# Patient Record
Sex: MALE | Race: BLACK OR AFRICAN AMERICAN | NOT HISPANIC OR LATINO | Employment: FULL TIME | URBAN - METROPOLITAN AREA
[De-identification: names, ages, dates, MRNs, and addresses within clinical notes are randomized per-mention and may not be internally consistent; named-entity substitution may affect disease eponyms.]

---

## 2024-11-11 ENCOUNTER — HOSPITAL ENCOUNTER (EMERGENCY)
Facility: HOSPITAL | Age: 24
Discharge: HOME | End: 2024-11-11
Attending: STUDENT IN AN ORGANIZED HEALTH CARE EDUCATION/TRAINING PROGRAM
Payer: MEDICAID

## 2024-11-11 VITALS
BODY MASS INDEX: 28.44 KG/M2 | DIASTOLIC BLOOD PRESSURE: 65 MMHG | HEIGHT: 72 IN | TEMPERATURE: 98 F | OXYGEN SATURATION: 100 % | RESPIRATION RATE: 18 BRPM | WEIGHT: 210 LBS | SYSTOLIC BLOOD PRESSURE: 124 MMHG | HEART RATE: 73 BPM
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
## 2024-11-11 DIAGNOSIS — S82.861A CLOSED DISPLACED MAISONNEUVE FRACTURE OF RIGHT LOWER EXTREMITY, INITIAL ENCOUNTER: Primary | ICD-10-CM

## 2024-11-11 DIAGNOSIS — Q72.91: ICD-10-CM

## 2024-11-11 LAB
ABS NEUT (OLG): 2.88 X10(3)/MCL (ref 2.1–9.2)
ALBUMIN SERPL-MCNC: 4.1 G/DL (ref 3.5–5)
ALBUMIN/GLOB SERPL: 1.2 RATIO (ref 1.1–2)
ALP SERPL-CCNC: 83 UNIT/L (ref 40–150)
ALT SERPL-CCNC: 35 UNIT/L (ref 0–55)
ANION GAP SERPL CALC-SCNC: 8 MEQ/L
AST SERPL-CCNC: 35 UNIT/L (ref 5–34)
BILIRUB SERPL-MCNC: 0.2 MG/DL
BUN SERPL-MCNC: 8.9 MG/DL (ref 8.9–20.6)
CALCIUM SERPL-MCNC: 9.4 MG/DL (ref 8.4–10.2)
CHLORIDE SERPL-SCNC: 109 MMOL/L (ref 98–107)
CO2 SERPL-SCNC: 23 MMOL/L (ref 22–29)
CREAT SERPL-MCNC: 1.09 MG/DL (ref 0.72–1.25)
CREAT/UREA NIT SERPL: 8
EOSINOPHIL NFR BLD MANUAL: 0.13 X10(3)/MCL (ref 0–0.9)
EOSINOPHIL NFR BLD MANUAL: 2 %
ERYTHROCYTE [DISTWIDTH] IN BLOOD BY AUTOMATED COUNT: 13.2 % (ref 11.5–17)
GFR SERPLBLD CREATININE-BSD FMLA CKD-EPI: >60 ML/MIN/1.73/M2
GLOBULIN SER-MCNC: 3.4 GM/DL (ref 2.4–3.5)
GLUCOSE SERPL-MCNC: 109 MG/DL (ref 74–100)
HCT VFR BLD AUTO: 40.6 % (ref 42–52)
HGB BLD-MCNC: 13.4 G/DL (ref 14–18)
INR PPP: 1
INSTRUMENT WBC (OLG): 6.26 X10(3)/MCL
LYMPHOCYTES NFR BLD MANUAL: 2.94 X10(3)/MCL
LYMPHOCYTES NFR BLD MANUAL: 47 %
MCH RBC QN AUTO: 28.5 PG (ref 27–31)
MCHC RBC AUTO-ENTMCNC: 33 G/DL (ref 33–36)
MCV RBC AUTO: 86.4 FL (ref 80–94)
MONOCYTES NFR BLD MANUAL: 0.31 X10(3)/MCL (ref 0.1–1.3)
MONOCYTES NFR BLD MANUAL: 5 %
NEUTROPHILS NFR BLD MANUAL: 46 %
NRBC BLD AUTO-RTO: 0 %
PLATELET # BLD AUTO: 241 X10(3)/MCL (ref 130–400)
PLATELET # BLD EST: NORMAL 10*3/UL
PMV BLD AUTO: 10.9 FL (ref 7.4–10.4)
POTASSIUM SERPL-SCNC: 3.8 MMOL/L (ref 3.5–5.1)
PROT SERPL-MCNC: 7.5 GM/DL (ref 6.4–8.3)
PROTHROMBIN TIME: 13.7 SECONDS (ref 12.5–14.5)
RBC # BLD AUTO: 4.7 X10(6)/MCL (ref 4.7–6.1)
RBC MORPH BLD: NORMAL
SODIUM SERPL-SCNC: 140 MMOL/L (ref 136–145)
WBC # BLD AUTO: 6.26 X10(3)/MCL (ref 4.5–11.5)

## 2024-11-11 PROCEDURE — 63600175 PHARM REV CODE 636 W HCPCS: Performed by: STUDENT IN AN ORGANIZED HEALTH CARE EDUCATION/TRAINING PROGRAM

## 2024-11-11 PROCEDURE — 85610 PROTHROMBIN TIME: CPT | Performed by: STUDENT IN AN ORGANIZED HEALTH CARE EDUCATION/TRAINING PROGRAM

## 2024-11-11 PROCEDURE — 96375 TX/PRO/DX INJ NEW DRUG ADDON: CPT

## 2024-11-11 PROCEDURE — 99285 EMERGENCY DEPT VISIT HI MDM: CPT | Mod: 25

## 2024-11-11 PROCEDURE — 96374 THER/PROPH/DIAG INJ IV PUSH: CPT

## 2024-11-11 PROCEDURE — 63600175 PHARM REV CODE 636 W HCPCS: Performed by: NURSE PRACTITIONER

## 2024-11-11 PROCEDURE — 25000003 PHARM REV CODE 250: Performed by: STUDENT IN AN ORGANIZED HEALTH CARE EDUCATION/TRAINING PROGRAM

## 2024-11-11 PROCEDURE — 80053 COMPREHEN METABOLIC PANEL: CPT | Performed by: STUDENT IN AN ORGANIZED HEALTH CARE EDUCATION/TRAINING PROGRAM

## 2024-11-11 PROCEDURE — 27781 TREATMENT OF FIBULA FRACTURE: CPT | Mod: RT

## 2024-11-11 PROCEDURE — 85007 BL SMEAR W/DIFF WBC COUNT: CPT | Performed by: STUDENT IN AN ORGANIZED HEALTH CARE EDUCATION/TRAINING PROGRAM

## 2024-11-11 RX ORDER — ONDANSETRON HYDROCHLORIDE 2 MG/ML
4 INJECTION, SOLUTION INTRAVENOUS
Status: COMPLETED | OUTPATIENT
Start: 2024-11-11 | End: 2024-11-11

## 2024-11-11 RX ORDER — HYDROCODONE BITARTRATE AND ACETAMINOPHEN 5; 325 MG/1; MG/1
1 TABLET ORAL EVERY 6 HOURS PRN
Qty: 12 TABLET | Refills: 0 | Status: SHIPPED | OUTPATIENT
Start: 2024-11-11

## 2024-11-11 RX ORDER — HYDROCODONE BITARTRATE AND ACETAMINOPHEN 10; 325 MG/1; MG/1
1 TABLET ORAL
Status: COMPLETED | OUTPATIENT
Start: 2024-11-11 | End: 2024-11-11

## 2024-11-11 RX ORDER — METHOCARBAMOL 100 MG/ML
1000 INJECTION, SOLUTION INTRAMUSCULAR; INTRAVENOUS ONCE
Status: COMPLETED | OUTPATIENT
Start: 2024-11-11 | End: 2024-11-11

## 2024-11-11 RX ORDER — METHOCARBAMOL 500 MG/1
1000 TABLET, FILM COATED ORAL 3 TIMES DAILY
Qty: 30 TABLET | Refills: 0 | Status: SHIPPED | OUTPATIENT
Start: 2024-11-11 | End: 2024-11-16

## 2024-11-11 RX ORDER — FENTANYL CITRATE 50 UG/ML
100 INJECTION, SOLUTION INTRAMUSCULAR; INTRAVENOUS
Status: COMPLETED | OUTPATIENT
Start: 2024-11-11 | End: 2024-11-11

## 2024-11-11 RX ORDER — ONDANSETRON 4 MG/1
4 TABLET, ORALLY DISINTEGRATING ORAL EVERY 6 HOURS PRN
Qty: 12 TABLET | Refills: 0 | Status: SHIPPED | OUTPATIENT
Start: 2024-11-11

## 2024-11-11 RX ORDER — MORPHINE SULFATE 4 MG/ML
4 INJECTION, SOLUTION INTRAMUSCULAR; INTRAVENOUS
Status: COMPLETED | OUTPATIENT
Start: 2024-11-11 | End: 2024-11-11

## 2024-11-11 RX ADMIN — HYDROCODONE BITARTRATE AND ACETAMINOPHEN 1 TABLET: 10; 325 TABLET ORAL at 07:11

## 2024-11-11 RX ADMIN — FENTANYL CITRATE 100 MCG: 50 INJECTION, SOLUTION INTRAMUSCULAR; INTRAVENOUS at 06:11

## 2024-11-11 RX ADMIN — ONDANSETRON 4 MG: 2 INJECTION INTRAMUSCULAR; INTRAVENOUS at 02:11

## 2024-11-11 RX ADMIN — METHOCARBAMOL 1000 MG: 100 INJECTION INTRAMUSCULAR; INTRAVENOUS at 03:11

## 2024-11-11 RX ADMIN — MORPHINE SULFATE 4 MG: 4 INJECTION INTRAVENOUS at 02:11

## 2024-11-11 NOTE — FIRST PROVIDER EVALUATION
Medical screening examination initiated.  I have conducted a focused provider triage encounter, findings are as follows:    Brief history of present illness:  Patient states right ankle and right lower leg pain after falling while riding a skateboard PTA.    Vitals:    11/11/24 1425   BP: (!) 167/85   Pulse: 98   Resp: 18   Temp: 98.3 °F (36.8 °C)   TempSrc: Oral   SpO2: 100%   Weight: 95.3 kg (210 lb)   Height: 6' (1.829 m)       Pertinent physical exam:  Awake, alert    Brief workup plan:  Imaging    Preliminary workup initiated; this workup will be continued and followed by the physician or advanced practice provider that is assigned to the patient when roomed.

## 2024-11-11 NOTE — Clinical Note
"Haley Bernaley" Ramiro was seen and treated in our emergency department on 11/11/2024.  He may return to school on 11/18/2024.      If you have any questions or concerns, please don't hesitate to call.      Maddi Tay RN"

## 2024-11-12 NOTE — ED PROVIDER NOTES
Encounter Date: 11/11/2024       History     Chief Complaint   Patient presents with    Leg Injury     Pt to ED via [facility]. Pt was on a skateboard and fell off. Ems reports a deformity to the RLE, pulses are good, Carlitos splint in place.     Haley Rubio is a 24 y.o. male with a past medical history of none who presents to the ED for right leg pain after a fall from a motorized scooter.  He denies any head injury or loss of consciousness.  He denies any neck, chest, abdominal pain, back pain.  He reports his pain has localized to his right lower leg.         Review of patient's allergies indicates:  No Known Allergies  No past medical history on file.  No past surgical history on file.  No family history on file.     Review of Systems   Constitutional:  Negative for fever.   HENT:  Negative for sore throat.    Respiratory:  Negative for shortness of breath.    Cardiovascular:  Negative for chest pain.   Gastrointestinal:  Negative for nausea.   Genitourinary:  Negative for dysuria.   Musculoskeletal:  Negative for back pain.   Skin:  Negative for rash.   Neurological:  Negative for weakness.   Hematological:  Does not bruise/bleed easily.       Physical Exam     Initial Vitals [11/11/24 1425]   BP Pulse Resp Temp SpO2   (!) 167/85 98 18 98.3 °F (36.8 °C) 100 %      MAP       --         Physical Exam    Nursing note and vitals reviewed.  Constitutional: He appears well-developed.   Eyes: EOM are normal. Pupils are equal, round, and reactive to light.   Cardiovascular:  Normal rate and regular rhythm.           No murmur heard.  Pulmonary/Chest: Breath sounds normal. No respiratory distress. He has no wheezes. He has no rales.   Abdominal: Abdomen is soft. He exhibits no distension. There is no abdominal tenderness.   Musculoskeletal:         General: Tenderness and edema present.     Neurological: He is alert. GCS score is 15. GCS eye subscore is 4. GCS verbal subscore is 5. GCS motor subscore is 6.   Skin: Skin is warm.  "Capillary refill takes less than 2 seconds. No rash noted.         ED Course   Splint Application    Date/Time: 2024 7:06 PM    Performed by: Colton Huffman MD  Authorized by: Colton Huffman MD  Consent Done: Yes  Consent: Verbal consent obtained.  Risks and benefits: risks, benefits and alternatives were discussed  Consent given by: patient  Patient identity confirmed: , name and verbally with patient  Time out: Immediately prior to procedure a "time out" was called to verify the correct patient, procedure, equipment, support staff and site/side marked as required.  Location details: right ankle  Splint type: long leg  Supplies used: Ortho-Glass  Post-procedure: The splinted body part was neurovascularly unchanged following the procedure.  Patient tolerance: Patient tolerated the procedure well with no immediate complications      Orthopedic Injury    Date/Time: 2024 6:00 PM    Performed by: Colton Huffman MD  Authorized by: Colton Huffman MD    Location procedure was performed:  [facility] EMERGENCY DEPARTMENT  Pre-operative diagnosis:  Fibula fracture  Post-operative diagnosis:  Fibula fracture  Consent Done?:  Yes  Universal Protocol:     Verbal consent obtained?: Yes      Risks and benefits: Risks, benefits and alternatives were discussed      Consent given by:  Patient    Patient identity confirmed:  , name and verbally with patient    Time Out: Immediately prior to the procedure a time out was called    Injury:     Injury location:  Lower leg    Location details:  Right lower leg    Injury type:  Fracture    Fracture type: proximal fibula        Pre-procedure assessment:     Neurovascular status: Neurovascularly intact      Distal perfusion: normal      Neurological function: normal      Range of motion: reduced      Local anesthesia used?: No      Patient sedated?: No        Selections made in this section will also lock the Injury type section above.:     Manipulation " performed?: Yes      Immobilization:  Splint    Splint type:  Long leg    Supplies used:  Ortho-Glass    Complications: No      Estimated blood loss (mL):  0    Specimens: No      Implants: No    Post-procedure assessment:     Neurovascular status: Neurovascularly intact      Distal perfusion: normal      Neurological function: normal      Range of motion: splinted      Patient tolerance:  Patient tolerated the procedure well with no immediate complications    Labs Reviewed   COMPREHENSIVE METABOLIC PANEL - Abnormal       Result Value    Sodium 140      Potassium 3.8      Chloride 109 (*)     CO2 23      Glucose 109 (*)     Blood Urea Nitrogen 8.9      Creatinine 1.09      Calcium 9.4      Protein Total 7.5      Albumin 4.1      Globulin 3.4      Albumin/Globulin Ratio 1.2      Bilirubin Total 0.2      ALP 83      ALT 35      AST 35 (*)     eGFR >60      Anion Gap 8.0      BUN/Creatinine Ratio 8     CBC WITH DIFFERENTIAL - Abnormal    WBC 6.26      RBC 4.70      Hgb 13.4 (*)     Hct 40.6 (*)     MCV 86.4      MCH 28.5      MCHC 33.0      RDW 13.2      Platelet 241      MPV 10.9 (*)     NRBC% 0.0     PROTIME-INR - Normal    PT 13.7      INR 1.0     CBC W/ AUTO DIFFERENTIAL    Narrative:     The following orders were created for panel order CBC auto differential.  Procedure                               Abnormality         Status                     ---------                               -----------         ------                     CBC with Differential[[id number]]       Abnormal            Final result               Manual Differential[[id number]]                             Final result                 Please view results for these tests on the individual orders.   MANUAL DIFFERENTIAL    WBC 6.26      Neutrophils % 46      Lymphs % 47      Monocytes % 5      Eosinophils % 2      Neutrophils Abs 2.8796      Lymphs Abs 2.9422      Monocytes Abs 0.313      Eosinophils Abs 0.1252      Platelets Normal      RBC Morph  Normal            Imaging Results              CT Leg (Tibia-Fibula) Without Contrast Left (Final result)  Result time 11/11/24 19:28:05      Final result by Jesus Bautista MD (11/11/24 19:28:05)                   Impression:      Fracture proximal shaft of the fibula.      Electronically signed by: Jesus Bautista  Date:    11/11/2024  Time:    19:28               Narrative:    EXAMINATION:  CT LEG (TIBIA-FIBULA) WITHOUT CONTRAST LEFT    CLINICAL HISTORY:  Fracture, tib/fib;    TECHNIQUE:  Helical acquisition through the left tibia and fibula without IV contrast.  Three plane reconstructions were provided for review.  mGycm. Automatic exposure control, adjustment of mA/kV or iterative reconstruction technique was used to reduce radiation.    COMPARISON:  Radiographs earlier today    FINDINGS:  There is mildly comminuted fracture proximal shaft of the fibula with near anatomic position and alignment.  No convincing acute fracture of the tibia.  Few punctate ossifications adjacent to the distal tibia favored chronic.  No joint effusion within the knee.                                       X-Ray Tibia Fibula 2 View Right (Final result)  Result time 11/11/24 18:59:35      Final result by José Manuel Larios MD (11/11/24 18:59:35)                   Impression:      Status post reduction of the slightly displaced proximal fibular fracture      Electronically signed by: Kevin Larios  Date:    11/11/2024  Time:    18:59               Narrative:    EXAMINATION:  XR TIBIA FIBULA 2 VIEW RIGHT    CLINICAL HISTORY:  Unspecified reduction defect of right lower limb    TECHNIQUE:  AP and lateral views of the right tibia and fibula were performed.    COMPARISON:  11/11/2024    FINDINGS:  There is a comminuted fracture of the proximal fibula.  It has been reduced in good anatomic alignment is noted.  No other fractures are seen.                                       X-Ray Ankle Complete Right (Final result)  Result time  11/11/24 15:16:28      Final result by Alberto Crowder MD (11/11/24 15:16:28)                   Narrative:    EXAMINATION  XR ANKLE COMPLETE 3 VIEW RIGHT    CLINICAL HISTORY  Unspecified fall, initial encounter    TECHNIQUE  A total of 3 images submitted of the right ankle.    COMPARISON  None available at the time of initial interpretation.    FINDINGS  There is abnormal widening of the medial clear space, measuring approximately 8 mm.  Slight asymmetric anterior widening of the tibiotalar joint space also noted.  No definite acutely displaced cortical fragment or gross dislocation is identified involving the hindfoot and partially visualized midfoot.    Regional soft tissue swelling is present, medial predominant.    IMPRESSION  1. Periarticular soft tissue swelling with widening of the medial clear space suggestive of ligamentous injury.  2. Possibility of subtle nondisplaced fracture is not entirely excluded.  Follow-up with non-contrast CT could be obtained for more sensitive assessment, if needed.      Electronically signed by: Alberto Crowder  Date:    11/11/2024  Time:    15:16                                     X-Ray Tibia Fibula 2 View Right (Final result)  Result time 11/11/24 15:23:13      Final result by Alberto Crowder MD (11/11/24 15:23:13)                   Narrative:    EXAMINATION  XR TIBIA FIBULA 2 VIEW RIGHT    CLINICAL HISTORY  Unspecified fall, initial encounter    TECHNIQUE  A total of 4 images submitted of the right leg.    COMPARISON  None available at the time of initial interpretation.    FINDINGS  Acute comminuted, minimally displaced fracture is present at the proximal fibular diaphysis.  No other definite acutely displaced fracture is appreciated.  There is no gross dislocation.    The included soft tissues are without acute abnormality.    IMPRESSION  Comminuted, minimally displaced proximal fibular fracture.      Electronically signed by: Alberto  Vel  Date:    11/11/2024  Time:    15:23                                     Medications   morphine injection 4 mg (4 mg Intravenous Given 11/11/24 1437)   ondansetron injection 4 mg (4 mg Intravenous Given 11/11/24 1437)   methocarbamoL injection 1,000 mg (1,000 mg Intravenous Given 11/11/24 1528)   fentaNYL injection 100 mcg (100 mcg Intravenous Given 11/11/24 1815)   HYDROcodone-acetaminophen  mg per tablet 1 tablet (1 tablet Oral Given 11/11/24 1929)     Medical Decision Making  Problems Addressed:  Closed displaced Maisonneuve fracture of right lower extremity, initial encounter: acute illness or injury  Fall: acute illness or injury  Reduction deformity of leg, right: acute illness or injury    Amount and/or Complexity of Data Reviewed  Labs: ordered.  Radiology: ordered. Decision-making details documented in ED Course.    Risk  Prescription drug management.      Differential diagnosis (includes but is not limited to):   Fracture, dislocation, abrasion, contusion, soft tissue injury, neurovascular injury    MDM Narrative  24-year-old male presents for evaluation of right ankle pain after a fall from a motorized skateboard earlier today.  He denies any head trauma or loss of consciousness.  He denies any neck, chest, abdominal pain.  It was reports significant right lower extremity pain.  Sensation is intact.  Palpable pulses to the right leg.  He does have some superficial abrasions.  Tetanus is up-to-date per patient.  Pain and nausea control given.  X-rays reviewed, evidence of a proximal fibula fracture noted as well as some widening of the clear space, concern for maisonneuve fracture.  Long-leg splint applied, CT scan ordered.  Case discussed with Orthopedic surgery, Dr. Valdez, patient can be discharged home with outpatient follow up in the office this week.  Patient in agreement with plan of care and states he will follow up with Orthopedic surgery has been discussed.  Strict return  precautions discussed and patient verbalized understanding.  Splint care instructions discussed.  Crutches provided for mobility.  Pain and nausea prescriptions provided.    Dispo: Discharge    My independent radiology interpretation: as above  Point of care US (independently performed and interpreted):   Decision rules/clinical scoring:     Sepsis Perfusion Assessment:     Amount and/or Complexity of Data Reviewed  Independent historian: none   Summary of history:   External data reviewed: notes from previous ED visits and notes from clinic visits  Summary of data reviewed: Prior records reviewed  Risk and benefits of testing: discussed   Labs: ordered and reviewed  Radiology: ordered and independent interpretation performed (see above or ED course)  ECG/medicine tests: ordered and independent interpretation performed (see above or ED course)  Discussion of management or test interpretation with external provider(s): discussed with ortho consultant   Summary of discussion: as above    Risk  Parenteral controlled substances   Drug therapy requiring intense monitoring for toxicity   Decision regarding hospitalization  Shared decision making     Critical Care  none    Data Reviewed/Counseling: I have personally reviewed the patient's vital signs, nursing notes, and other relevant tests, information, and imaging. I had a detailed discussion regarding the historical points, exam findings, and any diagnostic results supporting the discharge diagnosis. I personally performed the history, PE, MDM and procedures as documented above and agree with the scribe's documentation.    Portions of this note were dictated using voice recognition software. Although it was reviewed for accuracy, some inherent voice recognition errors may have occurred and may be present in this document.             ED Course as of 11/17/24 0550   Mon Nov 11, 2024   1536 X-Ray Tibia Fibula 2 View Right  Independently visualized/reviewed by me during  the ED visit.  - Proximal fibula fx [MC]   1537 X-Ray Ankle Complete Right  Independently visualized/reviewed by me during the ED visit.  - No obvious displaced fx but concern for joint space widening given proximal fibula fx and possible syndesmotic injury [MC]   1800 X-Ray Tibia Fibula 2 View Right  Independently visualized/reviewed by me during the ED visit.  - Splint in place, alignment improved [MC]   1923 I have reassessed the patient.  Patient is resting comfortably, no acute distress.  Vital signs stable.  Discussed all results including incidental findings.  Discussed need for follow up and discussed return precautions.  Answered all questions at this time.  Hemodynamically stable for continued outpatient management. Patient verbalized understanding and agreed to plan.    [MC]      ED Course User Index  [MC] Colton Huffman MD                           Clinical Impression:  Final diagnoses:  [W19.XXXA] Fall  [Q72.91] Reduction deformity of leg, right  [S82.861A] Closed displaced Maisonneuve fracture of right lower extremity, initial encounter (Primary)          ED Disposition Condition    Discharge Stable          ED Prescriptions       Medication Sig Dispense Start Date End Date Auth. Provider    HYDROcodone-acetaminophen (NORCO) 5-325 mg per tablet Take 1 tablet by mouth every 6 (six) hours as needed for Pain. 12 tablet 2024 -- Colton Huffman MD    methocarbamoL (ROBAXIN) 500 MG Tab () Take 2 tablets (1,000 mg total) by mouth 3 (three) times daily. for 5 days 30 tablet 2024 Colton Huffman MD    ondansetron (ZOFRAN-ODT) 4 MG TbDL Take 1 tablet (4 mg total) by mouth every 6 (six) hours as needed (Nausea). 12 tablet 2024 -- Colton Huffman MD          Follow-up Information       Follow up With Specialties Details Why Contact Info    Jovany Valdez MD Orthopedic Surgery Call in 1 day to confirm follow up appointment [address] [facility]  [address]  [facility] [address]  [phone]      Your PCP  Schedule an appointment as soon as possible for a visit       [facility] - Emergency Dept Emergency Medicine  If symptoms worsen [address] [facility] [id number] [phone]             Colton Huffman MD  11/17/24 0572

## 2024-11-12 NOTE — DISCHARGE INSTRUCTIONS

## 2024-11-13 ENCOUNTER — PATIENT MESSAGE (OUTPATIENT)
Dept: RESEARCH | Facility: HOSPITAL | Age: 24
End: 2024-11-13
Payer: MEDICAID

## 2024-11-20 ENCOUNTER — OFFICE VISIT (OUTPATIENT)
Dept: ORTHOPEDICS | Facility: CLINIC | Age: 24
End: 2024-11-20
Payer: MEDICAID

## 2024-11-20 VITALS
BODY MASS INDEX: 39.96 KG/M2 | HEIGHT: 72 IN | TEMPERATURE: 98 F | SYSTOLIC BLOOD PRESSURE: 122 MMHG | WEIGHT: 295 LBS | HEART RATE: 92 BPM | OXYGEN SATURATION: 98 % | DIASTOLIC BLOOD PRESSURE: 84 MMHG
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
## 2024-11-20 DIAGNOSIS — S82.861A CLOSED DISPLACED MAISONNEUVE FRACTURE OF RIGHT LOWER EXTREMITY, INITIAL ENCOUNTER: ICD-10-CM

## 2024-11-20 PROCEDURE — 99214 OFFICE O/P EST MOD 30 MIN: CPT | Mod: PBBFAC

## 2024-11-20 PROCEDURE — 99203 OFFICE O/P NEW LOW 30 MIN: CPT | Mod: 57,S$PBB,, | Performed by: ORTHOPAEDIC SURGERY

## 2024-11-20 PROCEDURE — 3008F BODY MASS INDEX DOCD: CPT | Mod: CPTII,,, | Performed by: ORTHOPAEDIC SURGERY

## 2024-11-20 PROCEDURE — 3074F SYST BP LT 130 MM HG: CPT | Mod: CPTII,,, | Performed by: ORTHOPAEDIC SURGERY

## 2024-11-20 PROCEDURE — 3079F DIAST BP 80-89 MM HG: CPT | Mod: CPTII,,, | Performed by: ORTHOPAEDIC SURGERY

## 2024-11-20 RX ORDER — METHOCARBAMOL 500 MG/1
1000 TABLET, FILM COATED ORAL 3 TIMES DAILY
COMMUNITY

## 2024-11-20 RX ORDER — CEFAZOLIN SODIUM 2 G/50ML
2 SOLUTION INTRAVENOUS
Status: CANCELLED | OUTPATIENT
Start: 2024-11-20

## 2024-11-20 RX ORDER — MUPIROCIN 20 MG/G
OINTMENT TOPICAL
Status: CANCELLED | OUTPATIENT
Start: 2024-11-20

## 2024-11-20 NOTE — PROGRESS NOTES
[facility]  Established Patient Office Visit  11/20/2024       Patient ID: Haley Rubio  YOB: 2000  MRN: [id number]    Chief Complaint: Injury of the Right Lower Leg (Closed displaced Maisonneuve fracture of right lower extremity due to skateboard)      HPI:  Haley Rubio is a 24 y.o. male fell off his electric skateboard 11/11 sustaining a right Maisonneuve fracture.  He had intermittent ambulate after the injury but had significant difficulty.  He reports continued pain to the right proximal fibula and ankle.  He denies any previous right ankle injury.  He was initially seen in the ED and placed in a long-leg splint.  He presents today for follow up    12 point ROS performed and negative except as above.     Past Medical History:    Past Medical History:   Diagnosis Date    Ankle fracture     right    History of spinal fracture      History reviewed. No pertinent surgical history.  Family History   Problem Relation Name Age of Onset    Hypertension Mother      Diabetes Father       Social History     Socioeconomic History    Marital status: Single   Tobacco Use    Smoking status: Never    Smokeless tobacco: Never   Substance and Sexual Activity    Alcohol use: Not Currently     Comment: occasional    Drug use: Never     Medication List with Changes/Refills   Current Medications    HYDROCODONE-ACETAMINOPHEN (NORCO) 5-325 MG PER TABLET    Take 1 tablet by mouth every 6 (six) hours as needed for Pain.    METHOCARBAMOL (ROBAXIN) 500 MG TAB    Take 1,000 mg by mouth 3 (three) times daily.    ONDANSETRON (ZOFRAN-ODT) 4 MG TBDL    Take 1 tablet (4 mg total) by mouth every 6 (six) hours as needed (Nausea).     Review of patient's allergies indicates:  No Known Allergies    Physical Exam:  Right Lower extremity:  Multiple superficial abrasions along the anterior aspect of the leg  1 area of road rash measuring 3 x 2 cm at the distal medial aspect of ankle  Diffuse swelling  about the ankle  TTP over proximal fibula.  Positive squeeze test  Motor intact: EHL/FHL/TA/GSC  SILT: DP/SP/T/Palacios/Sa  Palpable DP pulse    Imaging independently interpreted:  X-ray of the right ankle/tib-fib demonstrates comminuted proximal fibula fracture with mild shortening of the fibula.  Significant medial clear space widening on mortise view.     Assessment and Plan:    Haley Rubio is a 24 y.o. male who fell off an electric skateboard on 11/11 sustaining a right Maisonneuve fracture.    -given his syndesmotic injury and fibular shortening recommend surgical intervention for this injury.  Discussed risks and benefits.  Patient elected to proceed.  - plan for right syndesmosis fixation on 11/21/2024 with Dr. Parkinson.  Case requested   -NWB RLE  - place patient in Cam boot with Xeroform covering abrasions      Fela Mckoy MD PGY-3  [employer] Orthopaedic Surgery           Orders Placed This Encounter    Diet NPO    Cleanse with Chlorhexidine (CHG)    Place JAMI hose    Place sequential compression device    IP VTE LOW RISK PATIENT    Case Request Operating Room: FIXATION, SYNDESMOSIS, ANKLE

## 2024-11-20 NOTE — PROGRESS NOTES
Faculty Attestation: Haley Rubio  was seen at [facility] in the Orthopaedic Clinic. Patient seen and evaluated at the time of the visit. History of Present Illness, Physical Exam, and Assessment and Plan reviewed. Treatment plan is reasonable and appropriate. Compliance with treatment recommendations is important. No procedure was performed.     Malcom Parkinson MD  Orthopaedic Surgery

## 2024-11-21 ENCOUNTER — HOSPITAL ENCOUNTER (OUTPATIENT)
Facility: HOSPITAL | Age: 24
Discharge: HOME | End: 2024-11-21
Attending: ORTHOPAEDIC SURGERY | Admitting: ORTHOPAEDIC SURGERY
Payer: MEDICAID

## 2024-11-21 ENCOUNTER — ANESTHESIA EVENT (OUTPATIENT)
Dept: SURGERY | Facility: HOSPITAL | Age: 24
End: 2024-11-21
Payer: MEDICAID

## 2024-11-21 ENCOUNTER — ANESTHESIA (OUTPATIENT)
Dept: SURGERY | Facility: HOSPITAL | Age: 24
End: 2024-11-21
Payer: MEDICAID
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
## 2024-11-21 DIAGNOSIS — S82.861A CLOSED DISPLACED MAISONNEUVE FRACTURE OF RIGHT LOWER EXTREMITY, INITIAL ENCOUNTER: ICD-10-CM

## 2024-11-21 DIAGNOSIS — S82.861A MAISONNEUVE FRACTURE OF FIBULA, RIGHT, CLOSED, INITIAL ENCOUNTER: Primary | ICD-10-CM

## 2024-11-21 PROCEDURE — 36000708 HC OR TIME LEV III 1ST 15 MIN: Performed by: ORTHOPAEDIC SURGERY

## 2024-11-21 PROCEDURE — 71000033 HC RECOVERY, INTIAL HOUR: Performed by: ORTHOPAEDIC SURGERY

## 2024-11-21 PROCEDURE — 36000709 HC OR TIME LEV III EA ADD 15 MIN: Performed by: ORTHOPAEDIC SURGERY

## 2024-11-21 PROCEDURE — 37000008 HC ANESTHESIA 1ST 15 MINUTES: Performed by: ORTHOPAEDIC SURGERY

## 2024-11-21 PROCEDURE — 76942 ECHO GUIDE FOR BIOPSY: CPT | Mod: 26,,, | Performed by: ANESTHESIOLOGY

## 2024-11-21 PROCEDURE — 63600175 PHARM REV CODE 636 W HCPCS: Performed by: ANESTHESIOLOGY

## 2024-11-21 PROCEDURE — 63600175 PHARM REV CODE 636 W HCPCS: Performed by: NURSE ANESTHETIST, CERTIFIED REGISTERED

## 2024-11-21 PROCEDURE — 37000009 HC ANESTHESIA EA ADD 15 MINS: Performed by: ORTHOPAEDIC SURGERY

## 2024-11-21 PROCEDURE — 71000016 HC POSTOP RECOV ADDL HR: Performed by: ORTHOPAEDIC SURGERY

## 2024-11-21 PROCEDURE — 63600175 PHARM REV CODE 636 W HCPCS

## 2024-11-21 PROCEDURE — 71000015 HC POSTOP RECOV 1ST HR: Performed by: ORTHOPAEDIC SURGERY

## 2024-11-21 PROCEDURE — 64445 NJX AA&/STRD SCIATIC NRV IMG: CPT | Performed by: ANESTHESIOLOGY

## 2024-11-21 PROCEDURE — 25000003 PHARM REV CODE 250: Performed by: ANESTHESIOLOGY

## 2024-11-21 PROCEDURE — 25000003 PHARM REV CODE 250

## 2024-11-21 PROCEDURE — 27829 TREAT LOWER LEG JOINT: CPT | Mod: RT,,, | Performed by: ORTHOPAEDIC SURGERY

## 2024-11-21 PROCEDURE — C1713 ANCHOR/SCREW BN/BN,TIS/BN: HCPCS | Performed by: ORTHOPAEDIC SURGERY

## 2024-11-21 PROCEDURE — D9220A PRA ANESTHESIA: Mod: ANES,,, | Performed by: ANESTHESIOLOGY

## 2024-11-21 PROCEDURE — D9220A PRA ANESTHESIA: Mod: CRNA,,, | Performed by: NURSE ANESTHETIST, CERTIFIED REGISTERED

## 2024-11-21 PROCEDURE — 25000003 PHARM REV CODE 250: Performed by: NURSE ANESTHETIST, CERTIFIED REGISTERED

## 2024-11-21 PROCEDURE — 64450 NJX AA&/STRD OTHER PN/BRANCH: CPT | Mod: 59,RT,, | Performed by: ANESTHESIOLOGY

## 2024-11-21 PROCEDURE — 27201423 OPTIME MED/SURG SUP & DEVICES STERILE SUPPLY: Performed by: ORTHOPAEDIC SURGERY

## 2024-11-21 DEVICE — IMPLANTABLE DEVICE: Type: IMPLANTABLE DEVICE | Site: ANKLE | Status: FUNCTIONAL

## 2024-11-21 RX ORDER — ROPIVACAINE HYDROCHLORIDE 5 MG/ML
INJECTION, SOLUTION EPIDURAL; INFILTRATION; PERINEURAL
Status: COMPLETED | OUTPATIENT
Start: 2024-11-21 | End: 2024-11-21

## 2024-11-21 RX ORDER — ONDANSETRON HYDROCHLORIDE 2 MG/ML
INJECTION, SOLUTION INTRAVENOUS
Status: DISCONTINUED | OUTPATIENT
Start: 2024-11-21 | End: 2024-11-21

## 2024-11-21 RX ORDER — MORPHINE SULFATE 2 MG/ML
2 INJECTION, SOLUTION INTRAMUSCULAR; INTRAVENOUS EVERY 5 MIN PRN
Status: DISCONTINUED | OUTPATIENT
Start: 2024-11-21 | End: 2024-11-21 | Stop reason: HOSPADM

## 2024-11-21 RX ORDER — OXYCODONE HYDROCHLORIDE 5 MG/1
5 TABLET ORAL EVERY 6 HOURS PRN
Qty: 25 TABLET | Refills: 0 | Status: SHIPPED | OUTPATIENT
Start: 2024-11-21

## 2024-11-21 RX ORDER — MUPIROCIN 20 MG/G
OINTMENT TOPICAL
Status: DISCONTINUED | OUTPATIENT
Start: 2024-11-21 | End: 2024-11-21 | Stop reason: HOSPADM

## 2024-11-21 RX ORDER — MIDAZOLAM HYDROCHLORIDE 1 MG/ML
2 INJECTION INTRAMUSCULAR; INTRAVENOUS ONCE
Status: COMPLETED | OUTPATIENT
Start: 2024-11-21 | End: 2024-11-21

## 2024-11-21 RX ORDER — FENTANYL CITRATE 50 UG/ML
INJECTION, SOLUTION INTRAMUSCULAR; INTRAVENOUS
Status: DISCONTINUED | OUTPATIENT
Start: 2024-11-21 | End: 2024-11-21

## 2024-11-21 RX ORDER — ROCURONIUM BROMIDE 10 MG/ML
INJECTION, SOLUTION INTRAVENOUS
Status: DISCONTINUED | OUTPATIENT
Start: 2024-11-21 | End: 2024-11-21

## 2024-11-21 RX ORDER — PROPOFOL 10 MG/ML
VIAL (ML) INTRAVENOUS
Status: DISCONTINUED | OUTPATIENT
Start: 2024-11-21 | End: 2024-11-21

## 2024-11-21 RX ORDER — DEXAMETHASONE SODIUM PHOSPHATE 4 MG/ML
INJECTION, SOLUTION INTRA-ARTICULAR; INTRALESIONAL; INTRAMUSCULAR; INTRAVENOUS; SOFT TISSUE
Status: DISCONTINUED | OUTPATIENT
Start: 2024-11-21 | End: 2024-11-21

## 2024-11-21 RX ORDER — MEPERIDINE HYDROCHLORIDE 25 MG/ML
12.5 INJECTION INTRAMUSCULAR; INTRAVENOUS; SUBCUTANEOUS EVERY 10 MIN PRN
Status: DISCONTINUED | OUTPATIENT
Start: 2024-11-21 | End: 2024-11-21 | Stop reason: HOSPADM

## 2024-11-21 RX ORDER — CEFAZOLIN SODIUM 1 G/3ML
2 INJECTION, POWDER, FOR SOLUTION INTRAMUSCULAR; INTRAVENOUS
Status: COMPLETED | OUTPATIENT
Start: 2024-11-21 | End: 2024-11-21

## 2024-11-21 RX ORDER — GABAPENTIN 300 MG/1
300 CAPSULE ORAL 3 TIMES DAILY
Qty: 90 CAPSULE | Refills: 0 | Status: SHIPPED | OUTPATIENT
Start: 2024-11-21 | End: 2024-12-21

## 2024-11-21 RX ORDER — SODIUM CHLORIDE, SODIUM LACTATE, POTASSIUM CHLORIDE, CALCIUM CHLORIDE 600; 310; 30; 20 MG/100ML; MG/100ML; MG/100ML; MG/100ML
INJECTION, SOLUTION INTRAVENOUS CONTINUOUS
Status: DISCONTINUED | OUTPATIENT
Start: 2024-11-21 | End: 2024-11-21 | Stop reason: HOSPADM

## 2024-11-21 RX ORDER — GLUCAGON 1 MG
1 KIT INJECTION
Status: DISCONTINUED | OUTPATIENT
Start: 2024-11-21 | End: 2024-11-21 | Stop reason: HOSPADM

## 2024-11-21 RX ORDER — KETOROLAC TROMETHAMINE 30 MG/ML
INJECTION, SOLUTION INTRAMUSCULAR; INTRAVENOUS
Status: DISCONTINUED | OUTPATIENT
Start: 2024-11-21 | End: 2024-11-21

## 2024-11-21 RX ORDER — NAPROXEN SODIUM 220 MG/1
81 TABLET, FILM COATED ORAL 2 TIMES DAILY
Qty: 84 TABLET | Refills: 0 | Status: SHIPPED | OUTPATIENT
Start: 2024-11-21 | End: 2025-01-02

## 2024-11-21 RX ORDER — ONDANSETRON HYDROCHLORIDE 2 MG/ML
4 INJECTION, SOLUTION INTRAVENOUS DAILY PRN
Status: DISCONTINUED | OUTPATIENT
Start: 2024-11-21 | End: 2024-11-21 | Stop reason: HOSPADM

## 2024-11-21 RX ORDER — MIDAZOLAM HYDROCHLORIDE 2 MG/2ML
2 INJECTION, SOLUTION INTRAMUSCULAR; INTRAVENOUS ONCE AS NEEDED
Status: COMPLETED | OUTPATIENT
Start: 2024-11-21 | End: 2024-11-21

## 2024-11-21 RX ORDER — ONDANSETRON 4 MG/1
4 TABLET, FILM COATED ORAL EVERY 8 HOURS PRN
Qty: 15 TABLET | Refills: 0 | Status: SHIPPED | OUTPATIENT
Start: 2024-11-21 | End: 2024-11-28

## 2024-11-21 RX ORDER — PANTOPRAZOLE SODIUM 40 MG/1
40 TABLET, DELAYED RELEASE ORAL DAILY
Qty: 42 TABLET | Refills: 0 | Status: SHIPPED | OUTPATIENT
Start: 2024-11-21 | End: 2025-01-02

## 2024-11-21 RX ORDER — OXYCODONE HYDROCHLORIDE 5 MG/1
5 TABLET ORAL
Status: DISCONTINUED | OUTPATIENT
Start: 2024-11-21 | End: 2024-11-21 | Stop reason: HOSPADM

## 2024-11-21 RX ORDER — METHOCARBAMOL 750 MG/1
750 TABLET, FILM COATED ORAL ONCE
Status: COMPLETED | OUTPATIENT
Start: 2024-11-21 | End: 2024-11-21

## 2024-11-21 RX ORDER — ACETAMINOPHEN 500 MG
1000 TABLET ORAL EVERY 8 HOURS
Qty: 90 TABLET | Refills: 0 | Status: SHIPPED | OUTPATIENT
Start: 2024-11-21

## 2024-11-21 RX ORDER — MELOXICAM 15 MG/1
15 TABLET ORAL DAILY
Qty: 14 TABLET | Refills: 0 | Status: SHIPPED | OUTPATIENT
Start: 2024-11-21

## 2024-11-21 RX ORDER — LIDOCAINE HYDROCHLORIDE 20 MG/ML
INJECTION INTRAVENOUS
Status: DISCONTINUED | OUTPATIENT
Start: 2024-11-21 | End: 2024-11-21

## 2024-11-21 RX ORDER — SODIUM CHLORIDE 0.9 % (FLUSH) 0.9 %
10 SYRINGE (ML) INJECTION
Status: DISCONTINUED | OUTPATIENT
Start: 2024-11-21 | End: 2024-11-21 | Stop reason: HOSPADM

## 2024-11-21 RX ADMIN — PROPOFOL 200 MG: 10 INJECTION, EMULSION INTRAVENOUS at 01:11

## 2024-11-21 RX ADMIN — DEXAMETHASONE SODIUM PHOSPHATE 8 MG: 4 INJECTION, SOLUTION INTRA-ARTICULAR; INTRALESIONAL; INTRAMUSCULAR; INTRAVENOUS; SOFT TISSUE at 01:11

## 2024-11-21 RX ADMIN — SUGAMMADEX 200 MG: 100 INJECTION, SOLUTION INTRAVENOUS at 03:11

## 2024-11-21 RX ADMIN — FENTANYL CITRATE 100 MCG: 50 INJECTION INTRAMUSCULAR; INTRAVENOUS at 01:11

## 2024-11-21 RX ADMIN — OXYCODONE HYDROCHLORIDE 5 MG: 5 TABLET ORAL at 03:11

## 2024-11-21 RX ADMIN — ROCURONIUM BROMIDE 50 MG: 10 INJECTION INTRAVENOUS at 01:11

## 2024-11-21 RX ADMIN — KETOROLAC TROMETHAMINE 30 MG: 30 INJECTION, SOLUTION INTRAMUSCULAR at 02:11

## 2024-11-21 RX ADMIN — LIDOCAINE HYDROCHLORIDE 80 MG: 20 INJECTION INTRAVENOUS at 01:11

## 2024-11-21 RX ADMIN — ROPIVACAINE HYDROCHLORIDE 30 ML: 5 INJECTION, SOLUTION EPIDURAL; INFILTRATION; PERINEURAL at 12:11

## 2024-11-21 RX ADMIN — PROPOFOL 40 MG: 10 INJECTION, EMULSION INTRAVENOUS at 02:11

## 2024-11-21 RX ADMIN — METHOCARBAMOL 750 MG: 750 TABLET ORAL at 04:11

## 2024-11-21 RX ADMIN — CEFAZOLIN 3 G: 330 INJECTION, POWDER, FOR SOLUTION INTRAMUSCULAR; INTRAVENOUS at 01:11

## 2024-11-21 RX ADMIN — MIDAZOLAM HYDROCHLORIDE 2 MG: 1 INJECTION, SOLUTION INTRAMUSCULAR; INTRAVENOUS at 01:11

## 2024-11-21 RX ADMIN — ONDANSETRON 4 MG: 2 INJECTION INTRAMUSCULAR; INTRAVENOUS at 02:11

## 2024-11-21 NOTE — DISCHARGE INSTRUCTIONS
Keep follow up appointment at [facility]-3rd Floor.     · Take pain medications as prescribed.     · Keep ankle elevated on pillow, higher than the level of your heart.  NO WEIGHT BEARING, NO WALKING on left leg until released by the doctor.     · No driving or consuming alcohol for the next 24 hours or while taking narcotic pain medicine.     · May apply ice pack to surgical area for 20 minutes at a time 6-8 times per day.     · Keep dressing clean, intact and dry till clinic visit.     - Notify MD of any moderate to severe pain unrelieved by pain medicine or for any signs of infection including fever above 100.4, excessive redness or swelling, yellow/green foul- smelling drainage, nausea or vomiting. Call clinic at: [phone]. After business hours, you would need to be evaluated @ the nearest urgent care or emergency department    ·  Thanks for choosing [facility]! Have a smooth recovery!

## 2024-11-21 NOTE — BRIEF OP NOTE
[facility] - Periop Services  Brief Operative Note    Surgery Date: 11/21/2024     Surgeons and Role:     * Malcom Parkinson MD - Primary    Assisting Surgeon: Fela Mckoy MD    Pre-op Diagnosis:  Right ankle syndesmotic injury     Post-op Diagnosis:  Post-Op Diagnosis Codes:     * Closed displaced Maisonneuve fracture of right lower extremity, initial encounter [S82.861A]    Procedure(s) (LRB):  FIXATION, SYNDESMOSIS, ANKLE (Right)    Anesthesia: General    Operative Findings: See full operative note    Estimated Blood Loss: 10cc         Specimens:   Specimen (24h ago, onward)      None              Discharge Note    OUTCOME: Patient tolerated treatment/procedure well without complication and is now ready for discharge.    DISPOSITION: Home or Self Care    FINAL DIAGNOSIS:  Right ankle syndesmotic injury     FOLLOWUP: In clinic    DISCHARGE INSTRUCTIONS:    Discharge Procedure Orders   Notify your health care provider if you experience any of the following:  redness, tenderness, or signs of infection (pain, swelling, redness, odor or green/yellow discharge around incision site)     Leave dressing on - Keep it clean, dry, and intact until clinic visit     Weight bearing restrictions (specify):   Order Comments: NWB RLE        Clinical Reference Documents Added to Patient Instructions         Document    ANKLE FRACTURE DISCHARGE INSTRUCTIONS (ENGLISH)

## 2024-11-21 NOTE — PROGRESS NOTES
Update on surgery schedule given to patient. There is one surgery ahead of him. Denies any needs at this time. Call bell at side.

## 2024-11-21 NOTE — H&P
Interval H&P    Patient seen and examined in preop holding area. No changes to history or exam other than noted below.    To OR today for Open reduction and internal fixation of right ankle syndesmosis.       [facility]  Established Patient Office Visit  11/21/2024       Patient ID: Haley Rubio  YOB: 2000  MRN: [id number]    Chief Complaint: No chief complaint on file.      HPI:  Haley Rubio is a 24 y.o. male fell off his electric skateboard 11/11 sustaining a right Maisonneuve fracture.  He had intermittent ambulate after the injury but had significant difficulty.  He reports continued pain to the right proximal fibula and ankle.  He denies any previous right ankle injury.  He was initially seen in the ED and placed in a long-leg splint.  He presents today for follow up    12 point ROS performed and negative except as above.     Past Medical History:    Past Medical History:   Diagnosis Date    Ankle fracture     right    History of spinal fracture      History reviewed. No pertinent surgical history.  Family History   Problem Relation Name Age of Onset    Hypertension Mother      Diabetes Father       Social History     Socioeconomic History    Marital status: Single   Tobacco Use    Smoking status: Never    Smokeless tobacco: Never   Substance and Sexual Activity    Alcohol use: Not Currently     Comment: occasional    Drug use: Never     Medication List with Changes/Refills   Current Medications    HYDROCODONE-ACETAMINOPHEN (NORCO) 5-325 MG PER TABLET    Take 1 tablet by mouth every 6 (six) hours as needed for Pain.    METHOCARBAMOL (ROBAXIN) 500 MG TAB    Take 1,000 mg by mouth 3 (three) times daily.    ONDANSETRON (ZOFRAN-ODT) 4 MG TBDL    Take 1 tablet (4 mg total) by mouth every 6 (six) hours as needed (Nausea).     Review of patient's allergies indicates:  No Known Allergies    Physical Exam:  Right Lower extremity:  Multiple superficial abrasions along the  anterior aspect of the leg  1 area of road rash measuring 3 x 2 cm at the distal medial aspect of ankle  Diffuse swelling about the ankle  TTP over proximal fibula.  Positive squeeze test  Motor intact: EHL/FHL/TA/GSC  SILT: DP/SP/T/Palacios/Sa  Palpable DP pulse    Imaging independently interpreted:  X-ray of the right ankle/tib-fib demonstrates comminuted proximal fibula fracture with mild shortening of the fibula.  Significant medial clear space widening on mortise view.     Assessment and Plan:    Haley Rubio is a 24 y.o. male who fell off an electric skateboard on 11/11 sustaining a right Maisonneuve fracture.    -given his syndesmotic injury and fibular shortening recommend surgical intervention for this injury.  Discussed risks and benefits.  Patient elected to proceed.  - plan for right syndesmosis fixation on 11/21/2024 with Dr. Parkinson.  Case requested   -NWB RLE  - place patient in Cam boot with Xeroform covering abrasions      Fela Mckoy MD PGY-3  [employer] [facility]

## 2024-11-21 NOTE — ANESTHESIA PROCEDURE NOTES
Intubation    Date/Time: 11/21/2024 1:34 PM    Performed by: Radha Landrum CRNA  Authorized by: Elise Ramirez MD    Intubation:     Induction:  Intravenous    Intubated:  Postinduction    Mask Ventilation:  Easy mask    Attempts:  1    Attempted By:  CRNA    Method of Intubation:  Direct    Blade:  Samuel 2    Laryngeal View Grade: Grade I - full view of cords      Difficult Airway Encountered?: No      Complications:  None    Airway Device:  Oral endotracheal tube    Airway Device Size:  7.5    Style/Cuff Inflation:  Cuffed (inflated to minimal occlusive pressure)    Inflation Amount (mL):  8    Tube secured:  23    Secured at:  The lips    Placement Verified By:  Capnometry    Complicating Factors:  None    Findings Post-Intubation:  BS equal bilateral and atraumatic/condition of teeth unchanged

## 2024-11-21 NOTE — ANESTHESIA PROCEDURE NOTES
Peripheral Block    Patient location during procedure: pre-op   Block not for primary anesthetic.  Reason for block: at surgeon's request and post-op pain management   Post-op Pain Location: Rt ankle pain   Start time: 11/21/2024 12:48 PM  Timeout: 11/21/2024 12:48 PM   End time: 11/21/2024 12:53 PM    Staffing  Authorizing Provider: Elise Ramirez MD  Performing Provider: Elise Ramirez MD    Staffing  Performed by: Elise Ramirez MD  Authorized by: Elise Ramirez MD    Preanesthetic Checklist  Completed: patient identified, IV checked, site marked, risks and benefits discussed, surgical consent, monitors and equipment checked, pre-op evaluation and timeout performed  Peripheral Block  Patient position: supine  Prep: ChloraPrep  Patient monitoring: heart rate, cardiac monitor, continuous pulse ox, continuous capnometry and frequent blood pressure checks  Block type: popliteal  Laterality: right  Injection technique: single shot  Needle  Needle type: Stimuplex   Needle gauge: 21 G  Needle length: 4 in  Needle localization: anatomical landmarks and ultrasound guidance   -ultrasound image captured on disc.  Assessment  Injection assessment: negative aspiration, negative parasthesia and local visualized surrounding nerve  Paresthesia pain: none  Heart rate change: no  Slow fractionated injection: yes  Pain Tolerance: comfortable throughout block and no complaints  Medications:    Medications: ropivacaine (NAROPIN) injection 0.5% - Perineural   30 mL - 11/21/2024 12:49:00 PM    Additional Notes  VSS.  [facility] RN monitoring vitals throughout procedure.  Patient tolerated procedure well.

## 2024-11-21 NOTE — ANESTHESIA PREPROCEDURE EVALUATION
11/21/2024  Haley Rubio is a 24 y.o., male with PMHx of morbid obesity presents for Rt ankle syndesmosis/fixation.    NO BETA BLOCKER USE    Active Ambulatory Problems     Diagnosis Date Noted    No Active Ambulatory Problems     Resolved Ambulatory Problems     Diagnosis Date Noted    No Resolved Ambulatory Problems     Past Medical History:   Diagnosis Date    Ankle fracture     History of spinal fracture        Pre-op Assessment    I have reviewed the NPO Status.      Review of Systems  Anesthesia Hx:  No previous Anesthesia                Social:  Non-Smoker       Cardiovascular:  Cardiovascular Normal                                              Pulmonary:  Pulmonary Normal                       Renal/:  Renal/ Normal                 Hepatic/GI:  Hepatic/GI Normal                    Endocrine:        Morbid Obesity / BMI > 40    Vitals:    11/20/24 1600 11/21/24 1028 11/21/24 1038 11/21/24 1045   BP:  124/88  124/88   Pulse:  105     Temp:  37.1 °C (98.7 °F)     TempSrc:  Oral     SpO2:  98%     Weight: 133.8 kg (295 lb)  (!) 137.9 kg (304 lb)    Height: 6' (1.829 m)            Physical Exam  General: Alert, Cooperative and Well nourished    Airway:  Mallampati: II   Mouth Opening: Normal  TM Distance: Normal  Tongue: Normal  Neck ROM: Normal ROM    Dental:  Intact    Chest/Lungs:  Normal Respiratory Rate, Clear to auscultation    Heart:  Rate: Normal  Rhythm: Regular Rhythm  Sounds: Normal      Lab Results   Component Value Date    WBC 6.26 11/11/2024    WBC 6.26 11/11/2024    HGB 13.4 (L) 11/11/2024    HCT 40.6 (L) 11/11/2024    MCV 86.4 11/11/2024     11/11/2024       CMP  Sodium   Date Value Ref Range Status   11/11/2024 140 136 - 145 mmol/L Final     Potassium   Date Value Ref Range Status   11/11/2024 3.8 3.5 - 5.1 mmol/L Final     Chloride   Date Value Ref Range Status   11/11/2024  109 (H) 98 - 107 mmol/L Final     CO2   Date Value Ref Range Status   11/11/2024 23 22 - 29 mmol/L Final     Blood Urea Nitrogen   Date Value Ref Range Status   11/11/2024 8.9 8.9 - 20.6 mg/dL Final     Creatinine   Date Value Ref Range Status   11/11/2024 1.09 0.72 - 1.25 mg/dL Final     Calcium   Date Value Ref Range Status   11/11/2024 9.4 8.4 - 10.2 mg/dL Final     Albumin   Date Value Ref Range Status   11/11/2024 4.1 3.5 - 5.0 g/dL Final     Bilirubin Total   Date Value Ref Range Status   11/11/2024 0.2 <=1.5 mg/dL Final     ALP   Date Value Ref Range Status   11/11/2024 83 40 - 150 unit/L Final     AST   Date Value Ref Range Status   11/11/2024 35 (H) 5 - 34 unit/L Final     ALT   Date Value Ref Range Status   11/11/2024 35 0 - 55 unit/L Final     eGFR   Date Value Ref Range Status   11/11/2024 >60 mL/min/1.73/m2 Final         Anesthesia Plan  Type of Anesthesia, risks & benefits discussed:    Anesthesia Type: Gen ETT  Intra-op Monitoring Plan: Standard ASA Monitors  Post Op Pain Control Plan: IV/PO Opioids PRN  Induction:  IV  Airway Plan: Direct  Informed Consent: Informed consent signed with the Patient and all parties understand the risks and agree with anesthesia plan.  All questions answered.   ASA Score: 1  Day of Surgery Review of History & Physical: H&P Update referred to the surgeon/provider.    Ready For Surgery From Anesthesia Perspective.     .

## 2024-11-21 NOTE — TRANSFER OF CARE
Anesthesia Transfer of Care Note    Patient: Haley Rubio    Procedure(s) Performed: Procedure(s) (LRB):  FIXATION, SYNDESMOSIS, ANKLE (Right)    Patient location: PACU    Anesthesia Type: general    Transport from OR: Transported from OR on room air with adequate spontaneous ventilation    Post pain: adequate analgesia    Post assessment: no apparent anesthetic complications and tolerated procedure well    Post vital signs: stable    Level of consciousness: responds to stimulation and sedated    Nausea/Vomiting: no nausea/vomiting    Complications: none    Transfer of care protocol was followed      Last vitals: Visit Vitals  /75   Pulse 88   Temp 36.3 °C (97.3 °F) (Temporal)   Resp 20   Ht 6' (1.829 m)   Wt (!) 137.9 kg (304 lb)   SpO2 100%   BMI 41.23 kg/m²

## 2024-11-22 ENCOUNTER — PATIENT MESSAGE (OUTPATIENT)
Dept: ORTHOPEDICS | Facility: CLINIC | Age: 24
End: 2024-11-22
Payer: MEDICAID

## 2024-11-22 NOTE — OP NOTE
DATE OF PROCEDURE:   11/21/2024    SURGEON:  Malcom Parkinson M.D.    ASSISTANT: Dr. Mckoy    HOSPITAL: [facility]     PREOPERATIVE DIAGNOSIS:  1. Right proximal fibula fracture 2. Right ankle syndesmosis disruption     POSTOPERATIVE DIAGNOSIS:  Same     PROCEDURES PERFORMED:  1.  Open reduction internal fixation right ankle syndesmosis 2.  Right leg posterior splint secondary to proximal fibula fracture     ANESTHESIA: general    IV FLUIDS: Per Anesthesia    ESTIMATED BLOOD LOSS:  20cc     COUNTS:  Correct.    COMPLICATIONS:  None.    IMPLANTS:   Implant Name Type Inv. Item Serial No.  Lot No. LRB No. Used Action   KIT KNTLS T-ROPE SYNDSMOS DRVR - [id number]  KIT KNTLS T-ROPE SYNDSMOS DRVR  [employer] [id number] Right 1 Implanted and Explanted   KIT KNTLS T-ROPE SYNDSMOS DRVR - [id number]  KIT KNTLS T-ROPE SYNDSMOS DRVR  [employer] [id number] Right 2 Implanted   SCREW BONE ANKLE T15 60X3.5MM - [id number]  SCREW BONE ANKLE T15 60X3.5MM  [employer]  Right 2 Implanted   LOCKING 3RD TUBULAR PLATE 4 HOLE    [employer]  Right 1 Implanted       CONDITION: Stable to PACU.        INDICATIONS FOR PROCEDURE:    Haley Rubio is a 24 y.o. year old male with continued pain swelling loss of motion in his right ankle.  Patient was noted to have a proximal fibula fracture, ankle syndesmosis disruption with instability. The risks, benefits, and alternatives were discussed with the patient in detail. All questions were answered. Informed consent was obtained.       PROCEDURE IN DETAIL: Patient was found in preoperative holding by Anesthesia, confirmed fit for surgery.  The patient was taken to the operating room, placed on the operating table in supine position.  All prominences were well padded.  Time-out was called, identifying the correct patient, correct procedure, correct site.  All were in agreement.  Patient underwent general anesthesia without complications.  The patient was then prepped and draped in normal sterile fashion, leaving the  right lower extremity exposed to surgery.  After exsanguination of the right lower extremity tourniquet inflated.  Attention was turned to the right ankle where patient had a grossly unstable syndesmosis joint.  Given this a 6 cm incision was made over the lateral aspect of the right ankle, over the lateral malleolus.  Soft tissue dissection was carefully taken down carefully not to damage the neurovascular tendinous structures.  Next the syndesmosis was identified.  It was then anatomically reduced manually as well as appreciated visually on multiple views of the big C-arm.  This was done with use of the clamp as well.  Next a 4 hole 1/3 semi tubular plate was then chosen.  1 3.5 screw was placed across the syndesmosis, as well as an additional 2 tight rope devices were placed across the syndesmosis as well.  After the open reduction and internal fixation of the right ankle syndesmosis, multiple views of the big C-arm as well as stressing under fluoro of the C-arm found the hardware was in appropriate position, his ankle was well reduced and stable.  Attention was turned more proximally where patient had a proximal fibula fracture.  It was brought to length, it was stable, did not require hardware.  Tourniquet was released hemostasis was achieved copious irrigation used to wash the wound subcutaneous tissue was closed with 2-0 Vicryl skin was closed with 2-0 nylon sutures in standard interrupted fashion.  Xeroform 4x4s soft tissue dressing and a well-padded posterior splint was placed on the right lower extremity, woken by anesthesia, brought to the PACU in stable condition.       ______________________________  Malcom Parkinson MD

## 2024-11-25 VITALS
DIASTOLIC BLOOD PRESSURE: 77 MMHG | OXYGEN SATURATION: 99 % | BODY MASS INDEX: 41.17 KG/M2 | TEMPERATURE: 98 F | HEART RATE: 82 BPM | WEIGHT: 304 LBS | RESPIRATION RATE: 18 BRPM | HEIGHT: 72 IN | SYSTOLIC BLOOD PRESSURE: 135 MMHG

## 2024-12-01 NOTE — ANESTHESIA POSTPROCEDURE EVALUATION
Anesthesia Post Evaluation    Patient: Haley Rubio    Procedure(s) Performed: Procedure(s) (LRB):  FIXATION, SYNDESMOSIS, ANKLE (Right)  ORIF, ANKLE (Right)    Final Anesthesia Type: general      Patient location during evaluation: [facility]  Post-procedure vital signs: reviewed and stable  Airway patency: patent      Anesthetic complications: no      Cardiovascular status: hemodynamically stable  Respiratory status: spontaneous ventilation  Follow-up not needed.              Vitals Value Taken Time   /77 11/21/24 1645   Temp 36.7 °C (98.1 °F) 11/21/24 1645   Pulse 82 11/21/24 1645   Resp 18 11/21/24 1645   SpO2 99 % 11/21/24 1645         Event Time   Out of Recovery 15:40:00         Pain/Kayla Score: No data recorded

## 2024-12-04 ENCOUNTER — OFFICE VISIT (OUTPATIENT)
Dept: ORTHOPEDICS | Facility: CLINIC | Age: 24
End: 2024-12-04
Payer: MEDICAID

## 2024-12-04 ENCOUNTER — HOSPITAL ENCOUNTER (OUTPATIENT)
Dept: RADIOLOGY | Facility: HOSPITAL | Age: 24
Discharge: HOME | End: 2024-12-04
Attending: ORTHOPAEDIC SURGERY
Payer: MEDICAID

## 2024-12-04 VITALS
SYSTOLIC BLOOD PRESSURE: 118 MMHG | RESPIRATION RATE: 20 BRPM | HEART RATE: 75 BPM | WEIGHT: 304 LBS | DIASTOLIC BLOOD PRESSURE: 77 MMHG | HEIGHT: 72 IN | TEMPERATURE: 99 F | BODY MASS INDEX: 41.17 KG/M2 | OXYGEN SATURATION: 100 %

## 2024-12-04 DIAGNOSIS — Z98.890 POST-OPERATIVE STATE: ICD-10-CM

## 2024-12-04 DIAGNOSIS — Z98.890 POST-OPERATIVE STATE: Primary | ICD-10-CM

## 2024-12-04 PROCEDURE — 99214 OFFICE O/P EST MOD 30 MIN: CPT | Mod: PBBFAC,25

## 2024-12-04 PROCEDURE — 73610 X-RAY EXAM OF ANKLE: CPT | Mod: TC,RT

## 2024-12-04 NOTE — PROGRESS NOTES
[facility]  Established Patient Office Visit  12/04/2024       Patient ID: Haley Rubio  YOB: 2000  MRN: [id number]    Chief Complaint: Post-op Evaluation of the Right Ankle (ORIF right ankle 11/21/24 pain 5/10 takes Oxycodone as needed for pain splint intact uses crutches for ambulation)      HPI:  Haley Rubio is a 24 y.o. male fell off his electric skateboard 11/11 sustaining a right Maisonneuve fracture now s/p ORIF right syndesmosis on 11/21/2024.    Patient presents today for 2 week postop visit.  He has been compliant with nonweightbearing.  He denies any issues with his incisions.  He denies any interval falls or trauma.  His pain is well-controlled on his right ankle.       12 point ROS performed and negative except as above.     Past Medical History:    Past Medical History:   Diagnosis Date    Ankle fracture     right    History of spinal fracture      Past Surgical History:   Procedure Laterality Date    FIXATION OF SYNDESMOSIS OF ANKLE Right 11/21/2024    Procedure: FIXATION, SYNDESMOSIS, ANKLE;  Surgeon: Malcom Parkinson MD;  Location: [facility];  Service: Orthopedics;  Laterality: Right;  Synthes    OPEN REDUCTION AND INTERNAL FIXATION (ORIF) OF INJURY OF ANKLE Right 11/21/2024    Procedure: ORIF, ANKLE;  Surgeon: Malcom Parkinson MD;  Location: [facility];  Service: Orthopedics;  Laterality: Right;     Family History   Problem Relation Name Age of Onset    Hypertension Mother      Diabetes Father       Social History     Socioeconomic History    Marital status: Single   Tobacco Use    Smoking status: Never    Smokeless tobacco: Never   Substance and Sexual Activity    Alcohol use: Not Currently     Comment: occasional    Drug use: Never     Medication List with Changes/Refills   Current Medications    ACETAMINOPHEN (TYLENOL) 500 MG TABLET    Take 2 tablets (1,000 mg total) by mouth every 8 (eight) hours.    ASPIRIN 81 MG CHEW    Take 1 tablet (81 mg total) by  mouth 2 (two) times a day.    GABAPENTIN (NEURONTIN) 300 MG CAPSULE    Take 1 capsule (300 mg total) by mouth 3 (three) times daily.    HYDROCODONE-ACETAMINOPHEN (NORCO) 5-325 MG PER TABLET    Take 1 tablet by mouth every 6 (six) hours as needed for Pain.    MELOXICAM (MOBIC) 15 MG TABLET    Take 1 tablet (15 mg total) by mouth once daily.    METHOCARBAMOL (ROBAXIN) 500 MG TAB    Take 1,000 mg by mouth 3 (three) times daily.    ONDANSETRON (ZOFRAN-ODT) 4 MG TBDL    Take 1 tablet (4 mg total) by mouth every 6 (six) hours as needed (Nausea).    OXYCODONE (ROXICODONE) 5 MG IMMEDIATE RELEASE TABLET    Take 1 tablet (5 mg total) by mouth every 6 (six) hours as needed for Pain.    PANTOPRAZOLE (PROTONIX) 40 MG TABLET    Take 1 tablet (40 mg total) by mouth once daily.     Review of patient's allergies indicates:  No Known Allergies    Physical Exam:  Right Lower extremity:  Surgical incision with nylon sutures in place.  No evidence of wound dehiscence, drainage or surrounding erythema  Healing superficial abrasions along the anterior aspect of the leg  Diffuse swelling about the ankle  TTP over proximal fibula.    Motor intact: EHL/FHL/TA/GSC  SILT: DP/SP/T/Palacios/Sa  Palpable DP pulse    Imaging independently interpreted:  X-ray of the right ankle/tib-fib demonstrates interval fixation of right syndesmosis with tight rope since screw in place.  No evidence of interval displacement or hardware complication.    Assessment and Plan:    Haley Rubio is a 24 y.o. male who fell off an electric skateboard on 11/11 sustaining a right Maisonneuve fracture S/P ORIF right syndesmosis 11/21/2024.     - continue NWB RLE  - sutures removed in clinic today  - transition to Cam boot  - ice and elevate extremity; emphasized importance of elevating to help reduce swelling  - over-the-counter NSAIDs as needed for pain   -follow up in 4 weeks for repeat x-rays; we will likely progress weight-bearing at that time      Fela Mckoy MD  PGY-3  [employer] [facility]opa[facility]           Orders Placed This Encounter    X-Ray Ankle Complete Right

## 2024-12-05 NOTE — PROGRESS NOTES
Faculty Attestation: Haley Rubio  was seen at [facility] in the Orthopaedic Clinic. Discussed with the resident at the time of the visit. History of Present Illness, Physical Exam, and Assessment and Plan reviewed. Treatment plan is reasonable and appropriate. Compliance with treatment recommendations is important. No procedure was performed.     Jovany Valdez MD  Orthopaedic Surgery

## 2025-01-08 ENCOUNTER — OFFICE VISIT (OUTPATIENT)
Dept: ORTHOPEDICS | Facility: CLINIC | Age: 25
End: 2025-01-08
Payer: MEDICAID

## 2025-01-08 ENCOUNTER — HOSPITAL ENCOUNTER (OUTPATIENT)
Dept: RADIOLOGY | Facility: HOSPITAL | Age: 25
Discharge: HOME | End: 2025-01-08
Payer: MEDICAID

## 2025-01-08 VITALS
WEIGHT: 303.81 LBS | DIASTOLIC BLOOD PRESSURE: 84 MMHG | HEIGHT: 72 IN | OXYGEN SATURATION: 98 % | HEART RATE: 109 BPM | TEMPERATURE: 98 F | BODY MASS INDEX: 41.15 KG/M2 | SYSTOLIC BLOOD PRESSURE: 127 MMHG

## 2025-01-08 DIAGNOSIS — M25.571 RIGHT ANKLE PAIN, UNSPECIFIED CHRONICITY: Primary | ICD-10-CM

## 2025-01-08 DIAGNOSIS — M25.571 RIGHT ANKLE PAIN, UNSPECIFIED CHRONICITY: ICD-10-CM

## 2025-01-08 PROCEDURE — 99213 OFFICE O/P EST LOW 20 MIN: CPT | Mod: PBBFAC,25

## 2025-01-08 PROCEDURE — 3074F SYST BP LT 130 MM HG: CPT | Mod: CPTII,,, | Performed by: ORTHOPAEDIC SURGERY

## 2025-01-08 PROCEDURE — 3079F DIAST BP 80-89 MM HG: CPT | Mod: CPTII,,, | Performed by: ORTHOPAEDIC SURGERY

## 2025-01-08 PROCEDURE — 99024 POSTOP FOLLOW-UP VISIT: CPT | Mod: ,,, | Performed by: ORTHOPAEDIC SURGERY

## 2025-01-08 PROCEDURE — 73610 X-RAY EXAM OF ANKLE: CPT | Mod: TC,RT

## 2025-01-08 PROCEDURE — 1159F MED LIST DOCD IN RCRD: CPT | Mod: CPTII,,, | Performed by: ORTHOPAEDIC SURGERY

## 2025-01-08 NOTE — PROGRESS NOTES
[facility]  Established Patient Office Visit  01/08/2025       Patient ID: Haley Rubio  YOB: 2000  MRN: [id number]    Chief Complaint: Follow-up of the Right Ankle (4W ORIF R ANKLE 11/21/24)      HPI:  Haley Ruboi is a 24 y.o. male fell off his electric skateboard 11/11 sustaining a right Maisonneuve fracture now s/p ORIF right syndesmosis on 11/21/2024.    Patient presents today for 7 weeks postop visit.  He has been compliant with nonweightbearing.  He has been wearing his boot as instructed.  He denies any issues with his incisions.  He denies any interval falls or trauma.  His pain is well-controlled on his right ankle.  Overall happy with his progress in eager to continue to get better.      12 point ROS performed and negative except as above.     Past Medical History:    Past Medical History:   Diagnosis Date    Ankle fracture     right    History of spinal fracture      Past Surgical History:   Procedure Laterality Date    FIXATION OF SYNDESMOSIS OF ANKLE Right 11/21/2024    Procedure: FIXATION, SYNDESMOSIS, ANKLE;  Surgeon: Malcom Parknison MD;  Location: [facility];  Service: Orthopedics;  Laterality: Right;  Synthes    OPEN REDUCTION AND INTERNAL FIXATION (ORIF) OF INJURY OF ANKLE Right 11/21/2024    Procedure: ORIF, ANKLE;  Surgeon: Malcom Parkinson MD;  Location: [facility];  Service: Orthopedics;  Laterality: Right;     Family History   Problem Relation Name Age of Onset    Hypertension Mother      Diabetes Father       Social History     Socioeconomic History    Marital status: Single   Tobacco Use    Smoking status: Never    Smokeless tobacco: Never   Substance and Sexual Activity    Alcohol use: Not Currently     Comment: occasional    Drug use: Never     Medication List with Changes/Refills   Current Medications    ACETAMINOPHEN (TYLENOL) 500 MG TABLET    Take 2 tablets (1,000 mg total) by mouth every 8 (eight) hours.    ASPIRIN 81 MG CHEW    Take 1 tablet  (81 mg total) by mouth 2 (two) times a day.    GABAPENTIN (NEURONTIN) 300 MG CAPSULE    Take 1 capsule (300 mg total) by mouth 3 (three) times daily.    HYDROCODONE-ACETAMINOPHEN (NORCO) 5-325 MG PER TABLET    Take 1 tablet by mouth every 6 (six) hours as needed for Pain.    MELOXICAM (MOBIC) 15 MG TABLET    Take 1 tablet (15 mg total) by mouth once daily.    METHOCARBAMOL (ROBAXIN) 500 MG TAB    Take 1,000 mg by mouth 3 (three) times daily.    ONDANSETRON (ZOFRAN-ODT) 4 MG TBDL    Take 1 tablet (4 mg total) by mouth every 6 (six) hours as needed (Nausea).    OXYCODONE (ROXICODONE) 5 MG IMMEDIATE RELEASE TABLET    Take 1 tablet (5 mg total) by mouth every 6 (six) hours as needed for Pain.    PANTOPRAZOLE (PROTONIX) 40 MG TABLET    Take 1 tablet (40 mg total) by mouth once daily.     Review of patient's allergies indicates:  No Known Allergies    Physical Exam:  Right Lower extremity:  Surgical incision well healed.  No evidence of wound dehiscence, drainage or surrounding erythema  Diffuse swelling about the ankle  Mild TTP over proximal fibula.    Motor intact: EHL/FHL/TA/GSC  Able to dorsiflex the ankle to neutral  SILT: DP/SP/T/Palacios/Sa  Palpable DP pulse    Imaging independently interpreted:  X-ray of the right ankle/tib-fib demonstrates interval fixation of right syndesmosis with tight rope since screw in place.  No evidence of interval displacement or hardware complication.    Assessment and Plan:    Haley Rubio is a 24 y.o. male who fell off an electric skateboard on 11/11 sustaining a right Maisonneuve fracture S/P ORIF right syndesmosis 11/21/2024.     - Okay to slowly start transitioning to partial weight-bearing  - He is going to put partial weight with 2 crutches for 2 weeks, he will then do partial weight-bearing with 1 crutch for 1 week, Then he will do weight-bearing as tolerated in the boot  - discussed with him that if any point he starts to have pain he should go back 1 step in the progression  -  When he returns in 6 weeks we will likely progress him to a regular shoe  - return to clinic in 6 weeks for repeat x-rays   - Over-the-counter NSAIDs as needed for pain  - continue to elevate extremity  - we can fill out handicap sticker form today for him if he gives us paperwork       Gino Glasgow MD PGY-3  [employer] [facility]opa[facility] Surgery           Orders Placed This Encounter    X-Ray Ankle Complete Right

## 2025-02-17 ENCOUNTER — OFFICE VISIT (OUTPATIENT)
Dept: ORTHOPEDICS | Facility: CLINIC | Age: 25
End: 2025-02-17
Payer: MEDICAID

## 2025-02-17 ENCOUNTER — HOSPITAL ENCOUNTER (OUTPATIENT)
Dept: RADIOLOGY | Facility: HOSPITAL | Age: 25
Discharge: HOME | End: 2025-02-17
Payer: MEDICAID

## 2025-02-17 VITALS
DIASTOLIC BLOOD PRESSURE: 83 MMHG | TEMPERATURE: 98 F | HEIGHT: 72 IN | OXYGEN SATURATION: 97 % | BODY MASS INDEX: 42.43 KG/M2 | HEART RATE: 96 BPM | SYSTOLIC BLOOD PRESSURE: 132 MMHG | RESPIRATION RATE: 20 BRPM | WEIGHT: 313.25 LBS

## 2025-02-17 DIAGNOSIS — Z98.890 POST-OPERATIVE STATE: Primary | ICD-10-CM

## 2025-02-17 DIAGNOSIS — Z98.890 POST-OPERATIVE STATE: ICD-10-CM

## 2025-02-17 PROCEDURE — 73610 X-RAY EXAM OF ANKLE: CPT | Mod: TC,RT

## 2025-02-17 PROCEDURE — 3079F DIAST BP 80-89 MM HG: CPT | Mod: CPTII,,, | Performed by: ORTHOPAEDIC SURGERY

## 2025-02-17 PROCEDURE — 1159F MED LIST DOCD IN RCRD: CPT | Mod: CPTII,,, | Performed by: ORTHOPAEDIC SURGERY

## 2025-02-17 PROCEDURE — 99213 OFFICE O/P EST LOW 20 MIN: CPT | Mod: PBBFAC,25

## 2025-02-17 PROCEDURE — 3075F SYST BP GE 130 - 139MM HG: CPT | Mod: CPTII,,, | Performed by: ORTHOPAEDIC SURGERY

## 2025-02-17 NOTE — PROGRESS NOTES
Faculty Attestation: Haley Rubio  was seen at [facility] in the Orthopaedic Clinic in the outpatient department at a [facility]. Discussed with the resident at the time of the visit.  I participated in the management of the patient and was immediately available throughout the encounter. History of Present Illness, Physical Exam, and Assessment and Plan reviewed. Treatment plan is reasonable and appropriate. Compliance with treatment recommendations is important. No procedure was performed.     Osmin Yadav MD  Orthopedic Surgery Chief
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
## 2025-05-19 ENCOUNTER — OFFICE VISIT (OUTPATIENT)
Dept: ORTHOPEDICS | Facility: CLINIC | Age: 25
End: 2025-05-19
Payer: MEDICAID

## 2025-05-19 VITALS
DIASTOLIC BLOOD PRESSURE: 82 MMHG | HEIGHT: 72 IN | OXYGEN SATURATION: 100 % | HEART RATE: 73 BPM | SYSTOLIC BLOOD PRESSURE: 121 MMHG | RESPIRATION RATE: 20 BRPM | WEIGHT: 305 LBS | BODY MASS INDEX: 41.31 KG/M2 | TEMPERATURE: 98 F

## 2025-05-19 DIAGNOSIS — S82.861A CLOSED DISPLACED MAISONNEUVE FRACTURE OF RIGHT LOWER EXTREMITY, INITIAL ENCOUNTER: Primary | ICD-10-CM

## 2025-05-19 PROCEDURE — 3008F BODY MASS INDEX DOCD: CPT | Mod: CPTII,,, | Performed by: ORTHOPAEDIC SURGERY

## 2025-05-19 PROCEDURE — 99213 OFFICE O/P EST LOW 20 MIN: CPT | Mod: S$PBB,,, | Performed by: ORTHOPAEDIC SURGERY

## 2025-05-19 PROCEDURE — 1159F MED LIST DOCD IN RCRD: CPT | Mod: CPTII,,, | Performed by: ORTHOPAEDIC SURGERY

## 2025-05-19 PROCEDURE — 3079F DIAST BP 80-89 MM HG: CPT | Mod: CPTII,,, | Performed by: ORTHOPAEDIC SURGERY

## 2025-05-19 PROCEDURE — 99213 OFFICE O/P EST LOW 20 MIN: CPT | Mod: PBBFAC

## 2025-05-19 PROCEDURE — 3074F SYST BP LT 130 MM HG: CPT | Mod: CPTII,,, | Performed by: ORTHOPAEDIC SURGERY
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
## (undated) DEVICE — GLOVE SENSICARE NEOPRENE 6.5

## (undated) DEVICE — ELECTRODE PATIENT RETURN DISP

## (undated) DEVICE — GLOVE PROTEXIS BLUE LATEX 8.5

## (undated) DEVICE — PAD ABDOMINAL STERILE 8X10IN

## (undated) DEVICE — DRAPE C-ARMOR EQUIPMENT COVER

## (undated) DEVICE — SUT CTD VICRYL 2.0

## (undated) DEVICE — DRESSING XEROFORM NONADH 1X8IN

## (undated) DEVICE — SUT 2-0 ETHILON 18 FS

## (undated) DEVICE — GLOVE PROTEXIS HYDROGEL SZ6.5

## (undated) DEVICE — BANDAGE ESMARK ELASTIC ST 4X9

## (undated) DEVICE — K-LESS T-ROPE W/DRV, SYN REPR, SS
Type: IMPLANTABLE DEVICE | Site: ANKLE | Status: NON-FUNCTIONAL
Brand: ARTHREX®
Removed: 2024-11-21

## (undated) DEVICE — COVER TABLE HVY DTY 60X90IN

## (undated) DEVICE — MARKER FN REG DUAL UTIL RULER

## (undated) DEVICE — BIT DRILL 3.5MM

## (undated) DEVICE — CUFF TOURNIQUET DL PRT

## (undated) DEVICE — GLOVE SENSICARE PI GRN 6.5

## (undated) DEVICE — GOWN POLY REINF X-LONG 2XL

## (undated) DEVICE — KIT SURGICAL TURNOVER

## (undated) DEVICE — GLOVE PROTEXIS LTX MICRO 8

## (undated) DEVICE — DRAPE C-ARM COVER EZ 36X28IN

## (undated) DEVICE — APPLICATOR CHLORAPREP ORN 26ML

## (undated) DEVICE — SPONGE GAUZE 4X4 12 PLY STRL

## (undated) DEVICE — SOL NACL IRR 1000ML BTL

## (undated) DEVICE — COVER C-ARM STRAP BAND 44X80IN

## (undated) DEVICE — BANDAGE ACE ELASTIC 6"

## (undated) DEVICE — SPLINT FIBERGLASS PAD 4X15

## (undated) DEVICE — PADDING WYTEX UNDRCST 6INX4YD

## (undated) DEVICE — DRAPE T EXTRM SURG 121X128X90

## (undated) DEVICE — Device

## (undated) DEVICE — GLOVE 6.5 PROTEXIS PI BLUE

## (undated) DEVICE — DRAPE STERI U-SHAPED 47X51IN